# Patient Record
Sex: MALE | Race: WHITE | NOT HISPANIC OR LATINO | URBAN - METROPOLITAN AREA
[De-identification: names, ages, dates, MRNs, and addresses within clinical notes are randomized per-mention and may not be internally consistent; named-entity substitution may affect disease eponyms.]

---

## 2020-12-07 ENCOUNTER — EMERGENCY (EMERGENCY)
Facility: HOSPITAL | Age: 40
LOS: 1 days | End: 2020-12-07
Attending: EMERGENCY MEDICINE
Payer: COMMERCIAL

## 2020-12-07 VITALS
RESPIRATION RATE: 20 BRPM | OXYGEN SATURATION: 98 % | HEART RATE: 112 BPM | SYSTOLIC BLOOD PRESSURE: 135 MMHG | TEMPERATURE: 98 F | HEIGHT: 66 IN | DIASTOLIC BLOOD PRESSURE: 92 MMHG | WEIGHT: 139.99 LBS

## 2020-12-07 LAB
ALBUMIN SERPL ELPH-MCNC: 4.6 G/DL — SIGNIFICANT CHANGE UP (ref 3.3–5)
ALP SERPL-CCNC: 74 U/L — SIGNIFICANT CHANGE UP (ref 40–120)
ALT FLD-CCNC: 13 U/L — SIGNIFICANT CHANGE UP (ref 10–45)
ANION GAP SERPL CALC-SCNC: 11 MMOL/L — SIGNIFICANT CHANGE UP (ref 5–17)
AST SERPL-CCNC: 17 U/L — SIGNIFICANT CHANGE UP (ref 10–40)
BASOPHILS # BLD AUTO: 0.04 K/UL — SIGNIFICANT CHANGE UP (ref 0–0.2)
BASOPHILS NFR BLD AUTO: 0.5 % — SIGNIFICANT CHANGE UP (ref 0–2)
BILIRUB SERPL-MCNC: 1 MG/DL — SIGNIFICANT CHANGE UP (ref 0.2–1.2)
BUN SERPL-MCNC: 14 MG/DL — SIGNIFICANT CHANGE UP (ref 7–23)
CALCIUM SERPL-MCNC: 9.5 MG/DL — SIGNIFICANT CHANGE UP (ref 8.4–10.5)
CHLORIDE SERPL-SCNC: 103 MMOL/L — SIGNIFICANT CHANGE UP (ref 96–108)
CO2 SERPL-SCNC: 26 MMOL/L — SIGNIFICANT CHANGE UP (ref 22–31)
CREAT SERPL-MCNC: 1.02 MG/DL — SIGNIFICANT CHANGE UP (ref 0.5–1.3)
EOSINOPHIL # BLD AUTO: 0.02 K/UL — SIGNIFICANT CHANGE UP (ref 0–0.5)
EOSINOPHIL NFR BLD AUTO: 0.3 % — SIGNIFICANT CHANGE UP (ref 0–6)
GLUCOSE SERPL-MCNC: 94 MG/DL — SIGNIFICANT CHANGE UP (ref 70–99)
HCT VFR BLD CALC: 47.8 % — SIGNIFICANT CHANGE UP (ref 39–50)
HGB BLD-MCNC: 15.9 G/DL — SIGNIFICANT CHANGE UP (ref 13–17)
IMM GRANULOCYTES NFR BLD AUTO: 0.6 % — SIGNIFICANT CHANGE UP (ref 0–1.5)
LYMPHOCYTES # BLD AUTO: 1.14 K/UL — SIGNIFICANT CHANGE UP (ref 1–3.3)
LYMPHOCYTES # BLD AUTO: 14.4 % — SIGNIFICANT CHANGE UP (ref 13–44)
MAGNESIUM SERPL-MCNC: 2.2 MG/DL — SIGNIFICANT CHANGE UP (ref 1.6–2.6)
MCHC RBC-ENTMCNC: 30.2 PG — SIGNIFICANT CHANGE UP (ref 27–34)
MCHC RBC-ENTMCNC: 33.3 GM/DL — SIGNIFICANT CHANGE UP (ref 32–36)
MCV RBC AUTO: 90.9 FL — SIGNIFICANT CHANGE UP (ref 80–100)
MONOCYTES # BLD AUTO: 0.68 K/UL — SIGNIFICANT CHANGE UP (ref 0–0.9)
MONOCYTES NFR BLD AUTO: 8.6 % — SIGNIFICANT CHANGE UP (ref 2–14)
NEUTROPHILS # BLD AUTO: 5.97 K/UL — SIGNIFICANT CHANGE UP (ref 1.8–7.4)
NEUTROPHILS NFR BLD AUTO: 75.6 % — SIGNIFICANT CHANGE UP (ref 43–77)
NRBC # BLD: 0 /100 WBCS — SIGNIFICANT CHANGE UP (ref 0–0)
PHOSPHATE SERPL-MCNC: 2.7 MG/DL — SIGNIFICANT CHANGE UP (ref 2.5–4.5)
PLATELET # BLD AUTO: 206 K/UL — SIGNIFICANT CHANGE UP (ref 150–400)
POTASSIUM SERPL-MCNC: 4.5 MMOL/L — SIGNIFICANT CHANGE UP (ref 3.5–5.3)
POTASSIUM SERPL-SCNC: 4.5 MMOL/L — SIGNIFICANT CHANGE UP (ref 3.5–5.3)
PROT SERPL-MCNC: 6.9 G/DL — SIGNIFICANT CHANGE UP (ref 6–8.3)
RBC # BLD: 5.26 M/UL — SIGNIFICANT CHANGE UP (ref 4.2–5.8)
RBC # FLD: 11.9 % — SIGNIFICANT CHANGE UP (ref 10.3–14.5)
SARS-COV-2 RNA SPEC QL NAA+PROBE: SIGNIFICANT CHANGE UP
SODIUM SERPL-SCNC: 140 MMOL/L — SIGNIFICANT CHANGE UP (ref 135–145)
WBC # BLD: 7.9 K/UL — SIGNIFICANT CHANGE UP (ref 3.8–10.5)
WBC # FLD AUTO: 7.9 K/UL — SIGNIFICANT CHANGE UP (ref 3.8–10.5)

## 2020-12-07 PROCEDURE — 70498 CT ANGIOGRAPHY NECK: CPT | Mod: 26

## 2020-12-07 PROCEDURE — 70496 CT ANGIOGRAPHY HEAD: CPT | Mod: 26

## 2020-12-07 PROCEDURE — 99220: CPT

## 2020-12-07 RX ORDER — DIPHENHYDRAMINE HCL 50 MG
25 CAPSULE ORAL EVERY 6 HOURS
Refills: 0 | Status: DISCONTINUED | OUTPATIENT
Start: 2020-12-07 | End: 2020-12-11

## 2020-12-07 RX ORDER — ACETAMINOPHEN 500 MG
1000 TABLET ORAL ONCE
Refills: 0 | Status: DISCONTINUED | OUTPATIENT
Start: 2020-12-07 | End: 2020-12-11

## 2020-12-07 RX ORDER — METOCLOPRAMIDE HCL 10 MG
10 TABLET ORAL ONCE
Refills: 0 | Status: COMPLETED | OUTPATIENT
Start: 2020-12-07 | End: 2020-12-07

## 2020-12-07 RX ORDER — SODIUM CHLORIDE 9 MG/ML
1000 INJECTION INTRAMUSCULAR; INTRAVENOUS; SUBCUTANEOUS ONCE
Refills: 0 | Status: COMPLETED | OUTPATIENT
Start: 2020-12-07 | End: 2020-12-07

## 2020-12-07 RX ORDER — DIPHENHYDRAMINE HCL 50 MG
25 CAPSULE ORAL ONCE
Refills: 0 | Status: COMPLETED | OUTPATIENT
Start: 2020-12-07 | End: 2020-12-07

## 2020-12-07 RX ADMIN — Medication 10 MILLIGRAM(S): at 16:47

## 2020-12-07 RX ADMIN — SODIUM CHLORIDE 1000 MILLILITER(S): 9 INJECTION INTRAMUSCULAR; INTRAVENOUS; SUBCUTANEOUS at 16:47

## 2020-12-07 RX ADMIN — Medication 25 MILLIGRAM(S): at 16:47

## 2020-12-07 NOTE — ED CDU PROVIDER DISPOSITION NOTE - CLINICAL COURSE
40 year old male with a history of migraines, laminectomy for spinal cyst presents with left sided HA, residual left arm weakness, numbness and tingling after migraine yesterday. He reports that his migraines are usually associated with numbness and tingling in the arms, but symptoms usually resolve with Excedrin. The symptoms are lasting more than usual but more weakness of arm and leg which prompted him to come in today. He reports associated decreased left arm strength. He denies any facial drooping, slurred speech, fever, chills or cough.  In ED pts labs nonactionable. CT/CTA head without acute infarct, 7mm 4th ventricle possible choroid plexus tissue. pt was seen by neurology, went to CDU for MRI/neuro checks.  In CDU, 40 year old male with a history of migraines, laminectomy for spinal cyst presents with left sided HA, residual left arm weakness, numbness and tingling after migraine yesterday. He reports that his migraines are usually associated with numbness and tingling in the arms, but symptoms usually resolve with Excedrin. The symptoms are lasting more than usual but more weakness of arm and leg which prompted him to come in today. He reports associated decreased left arm strength. He denies any facial drooping, slurred speech, fever, chills or cough.  In ED pts labs nonactionable. CT/CTA head without acute infarct, 7mm 4th ventricle possible choroid plexus tissue. pt was seen by neurology, went to CDU for MRI/neuro checks.  In CDU, MRI results as follows. " MR brain: There is no mass, intracranial hemorrhage, or acute infarction. MR venogram head: No high-grade stenosis or occlusion within the dural venous sinuses". Pt feels imporved. Seen by Neuro this am who state -  "If MRI + for stroke will start ASA 81mg and lipitor 40mg daily.  - if MRI neg can f/u with me, Dr. Dru Duran as an outpatient for further workup." Given neg MRI will d/c home. Dr. Cruz has seen pt and is aware and agrees with plan.

## 2020-12-07 NOTE — ED PROVIDER NOTE - CLINICAL SUMMARY MEDICAL DECISION MAKING FREE TEXT BOX
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 No fever, chills, SOB, speech problems, or gait disturbance. Most likely hemiplegic migraine, but because of COVID, concern for neuro complication like stroke, TIA. No heart murmur but still needs workup for PFO is CT scan is abnormal. Will obtain blood work, CTA of head and neuro consult. ZR

## 2020-12-07 NOTE — ED CDU PROVIDER SUBSEQUENT DAY NOTE - MEDICAL DECISION MAKING DETAILS
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 No fever, chills, SOB, speech problems, or gait disturbance. Most likely hemiplegic migraine, but because of COVID, concern for neuro complication like stroke, TIA. No heart murmur to think of PFO ct scan in ER no evidence of acute stroke. Pt admitted  to CDU for neuro checks, neurology evaluation and MRI/MRV. Seen by neuro who agreed with plan. Will follow up results of MRI/MRV. If negative can follow up with Dr. Carey neurology. Pt looks good and is without complaints this morning  - ZR    Will obtain blood work, CTA of head and neuro consult.

## 2020-12-07 NOTE — CONSULT NOTE ADULT - ASSESSMENT
Rah Sevilla is a 41 yo L handed man with a history of migraines, laminectomy for spinal cyst, COVID in March who presents with HA and left sided weakness and numbness since yesterday. This HA differs from his previous migraines because it did not improve with Excedrin and he has persistent numbness and weakness. On neuro exam, he has decreased strength in LUE and LLE, and decreased sensation to the left side of his face. Labs are unremarkable.    Impression: Left sided weakness and subjective numbness in context of a change in characteristic of typical presentation of patient's migraine HA, history of COVID concerning for infarct localizable to the right side in possible posterior limb of internal capsule distribution, etiology questionable for ESUS. However, also in differential diagnosis is presentation of migraine HA.     [ ] MRI brain without contrast to r/o acute infarct  [ ] MRV to r/o a venous thrombus, though less likely   [ ] In acute setting, if HA returns, can treat with Ofirmev 1g, benadryl 25mg, and reglan 10mg.   [ ] If acute infarct found on MRI, will start aspirin 81mg.  [ ] After discharge, can follow up with Dr. Nissenbaum, [address] ([phone])     Rah Sevilla is a 39 yo L handed man with a history of migraines, laminectomy for spinal cyst, COVID in March who presents with HA and left sided weakness and numbness since yesterday. This HA differs from his previous migraines because it did not improve with Excedrin and he has persistent numbness and weakness. On neuro exam, he has decreased strength in LUE and LLE, and decreased sensation to the left side of his face. Labs are unremarkable.    Impression: Left sided weakness and subjective numbness in context of a change in characteristic of typical presentation of patient's migraine HA, history of COVID concerning for infarct localizable to the right side in possible posterior limb of internal capsule distribution, etiology questionable for ESUS. However, also in differential diagnosis is presentation of migraine HA with changes from patient's typical headache.    Recs:  [ ] MRI brain without contrast to r/o acute infarct  [ ] MRV to r/o a venous thrombus, though less likely   [ ] In acute setting, if HA returns, can treat with Ofirmev 1g, benadryl 25mg, and reglan 10mg.   [ ] If acute infarct found on MRI, will start aspirin 81mg.  [ ] After discharge, can follow up with Dr. Nissenbaum, [address] ([phone])    Case discussed and disposition finalized with stroke fellow Dr. Montes. Further recommendations to follow upon review by stroke attending, Dr. Duran.

## 2020-12-07 NOTE — ED ADULT TRIAGE NOTE - CHIEF COMPLAINT QUOTE
Pt stated he woke up with a "migraine headache" this morning at 630am with left sided numbness. Pt went to sleep last night at 11pm  hx: migraine

## 2020-12-07 NOTE — ED CDU PROVIDER DISPOSITION NOTE - CARE PROVIDER_API CALL
Dru Duran)  Neurology; Vascular Neurology  [address] [facility] [address]  [place] [address]  Phone: [phone]  Fax: [phone]  Follow Up Time:

## 2020-12-07 NOTE — ED ADULT NURSE NOTE - NS_ED_NURSE_TEACHING_TOPIC_ED_A_ED
Patient says she received a bill not sure why she received it  I advised that she bring the bill by the office so we could look at it, but she lives far away and has no fax  I told her I would take down her info and we will return her call to look into bill  Neurovascular/Migraine Headache

## 2020-12-07 NOTE — ED CDU PROVIDER DISPOSITION NOTE - NSFOLLOWUPINSTRUCTIONS_ED_ALL_ED_FT
Follow up with your primary care doctor within 1 week    Follow up with Neurology in 1-5 days - see attached contact info.  *Bring all attached lab/test results.*    Stay well hydrated.  Continue current home medications    Read stroke education material given to you.    Return if symptoms worsen, fever, weakness, numbness, dizziness, vision change, slurred speech and all other concerns Dru Duran)  Neurology; Vascular Neurology  [address] [facility] [address]  [place] [address]  Phone: [phone]  Fax: [phone]      1. Follow up with your primary care doctor within 1 week    2. Follow up with your Neurologist Dr. Carey or Stroke Neurology Dr. Martinez  in 1-5 days - see above contact info. Bring all attached lab/test results.     3. Stay well hydrated. Continue current home medications    4. Read stroke education material given to you.    5. Return if symptoms worsen, fever, weakness, numbness, dizziness, vision change, slurred speech and all other concerns    --------------------  **Stroke Prevention**  Some medical conditions and behaviors are associated with a higher chance of having a stroke. You can help prevent a stroke by making nutrition, lifestyle, and other changes, including managing any medical conditions you may have.  WHAT NUTRITION CHANGES CAN BE MADE?  Eat healthy foods. You can do this by:  · Choosing foods high in fiber, such as fresh fruits and vegetables and whole grains.  · Eating at least 5 or more servings of fruits and vegetables a day. Try to fill half of your plate at each meal with fruits and vegetables.  · Choosing lean protein foods, such as lean cuts of meat, poultry without skin, fish, tofu, beans, and nuts.  · Eating low-fat dairy products.  · Avoiding foods that are high in salt (sodium). This can help lower blood pressure.  · Avoiding foods that have saturated fat, trans fat, and cholesterol. This can help prevent high cholesterol.  · Avoiding processed and premade foods.  Follow your health care provider's specific guidelines for losing weight, controlling high blood pressure (hypertension), lowering high cholesterol, and managing diabetes. These may include:  · Reducing your daily calorie intake.  · Limiting your daily sodium intake to 1,500 milligrams (mg).  · Using only healthy fats for cooking, such as olive oil, canola oil, or sunflower oil.  · Counting your daily carbohydrate intake.  WHAT LIFESTYLE CHANGES CAN BE MADE?  Maintain a healthy weight. Talk to your health care provider about your ideal weight.  Get at least 30 minutes of moderate physical activity at least 5 days a week. Moderate activity includes brisk walking, biking, and swimming.  Do not use any products that contain nicotine or tobacco, such as cigarettes and e-cigarettes. If you need  help quitting, ask your health care provider. It may also be helpful to avoid exposure to secondhand smoke.  Limit alcohol intake to no more than 1 drink a day for nonpregnant women and 2 drinks a day for men. One  drink equals 12 oz of beer, 5 oz of wine, or 1½ oz of hard liquor.  Stop any illegal drug use.      Avoid taking birth control pills. Talk to your health care provider about the risks of taking birth control pills if:  · You are over 35 years old.  · You smoke.  · You get migraines.  · You have ever had a blood clot.  WHAT OTHER CHANGES CAN BE MADE?  Manage your cholesterol levels.  · Eating a healthy diet is important for preventing high cholesterol. If cholesterol cannot be managed  through diet alone, you may also need to take medicines.  · Take any prescribed medicines to control your cholesterol as told by your health care provider.  Manage your diabetes.  · Eating a healthy diet and exercising regularly are important parts of managing your blood sugar. If your  blood sugar cannot be managed through diet and exercise, you may need to take medicines.  · Take any prescribed medicines to control your diabetes as told by your health care provider.  Control your hypertension.  · To reduce your risk of stroke, try to keep your blood pressure below 130/80.  · Eating a healthy diet and exercising regularly are an important part of controlling your blood pressure. If  your blood pressure cannot be managed through diet and exercise, you may need to take medicines.  · Take any prescribed medicines to control hypertension as told by your health care provider.  · Ask your health care provider if you should monitor your blood pressure at home.  · Have your blood pressure checked every year, even if your blood pressure is normal. Blood pressure  increases with age and some medical conditions.  Get evaluated for sleep disorders (sleep apnea). Talk to your health care provider about getting a sleep evaluation if you snore a lot or have excessive sleepiness.  Take over-the-counter and prescription medicines only as told by your health care provider. Aspirin or blood thinners (antiplatelets or anticoagulants) may be recommended to reduce your risk of forming blood clots that can lead to stroke.  Make sure that any other medical conditions you have, such as atrial fibrillation or atherosclerosis, are  managed.  WHAT ARE THE WARNING SIGNS OF A STROKE?  The warning signs of a stroke can be easily remembered as BEFAST.  B is for balance. Signs include:  · Dizziness.  · Loss of balance or coordination.  · Sudden trouble walking.  E is for eyes. Signs include:  · A sudden change in vision.  · Trouble seeing.  F is for face. Signs include:  · Sudden weakness or numbness of the face.  · The face or eyelid drooping to one side.  A is for arms. Signs include:  · Sudden weakness or numbness of the arm, usually on one side of the body.  S is for speech. Signs include:  · Trouble speaking (aphasia).  · Trouble understanding.  T is for time.      · These symptoms may represent a serious problem that is an emergency. Do not wait to see if the symptoms will go away. Get medical help right away. Call your local emergency services (911 in the [place] Do not drive yourself to the hospital.  Other signs of stroke may include:  · A sudden, severe headache with no known cause.  · Nausea or vomiting.  · Seizure.    WHERE TO FIND MORE INFORMATION  For more information, visit:  American Stroke Association: www.strokeassociation.org  National Stroke Association: www.stroke.org  SUMMARY  You can prevent a stroke by eating healthy, exercising, not smoking, limiting alcohol intake, and managing  any medical conditions you may have.  Do not use any products that contain nicotine or tobacco, such as cigarettes and e-cigarettes. If you need  help quitting, ask your health care provider. It may also be helpful to avoid exposure to secondhand smoke.  Remember BEFAST for warning signs of stroke. Get help right away if you or a loved one has any of these  signs.  ADDITIONAL NOTES AND INSTRUCTIONS  Please follow up with your Primary MD in 24-48 hr.  Seek immediate medical care for any new/worsening signs or symptoms.

## 2020-12-07 NOTE — ED CDU PROVIDER DISPOSITION NOTE - PATIENT PORTAL LINK FT
You can access the FollowMyHealth Patient Portal offered by [facility] by registering at the following website: [email or web address]. By joining [facility]’s FollowMyHealth portal, you will also be able to view your health information using other applications (apps) compatible with our system.

## 2020-12-07 NOTE — ED CLERICAL - NS ED CLERK NOTE PRE-ARRIVAL INFORMATION; ADDITIONAL PRE-ARRIVAL INFORMATION
CC/Reason For referral: left sided weakness may be Migraines  r/o CVA  Preferred Consultant(if applicable): Cherry  Who admits for you (if needed): Cherry  Do you have documents you would like to fax over? no   Would you still like to speak to an ED attending? no

## 2020-12-07 NOTE — ED CDU PROVIDER INITIAL DAY NOTE - OBJECTIVE STATEMENT
40 year old male with a history of migraines, laminectomy for spinal cyst presents with left sided HA, residual left arm weakness, numbness and tingling after migraine yesterday. He reports that his migraines are usually associated with numbness and tingling in the arms, but symptoms usually resolve with Excedrin. The symptoms are lasting more than usual but more weakness of arm and leg which prompted him to come in today. He reports associated decreased left arm strength. He denies any facial drooping, slurred speech, fever, chills or cough. 40 year old male with a history of migraines, laminectomy for spinal cyst presents with left sided HA, residual left arm weakness, numbness and tingling after migraine yesterday. He reports that his migraines are usually associated with numbness and tingling in the arms, but symptoms usually resolve with Excedrin. The symptoms are lasting more than usual but more weakness of arm and leg which prompted him to come in today. He reports associated decreased left arm strength. He denies any facial drooping, slurred speech, fever, chills or cough.  In ED pts labs nonactionable. CT/CTA head without acute infarct, 7mm 4th ventricle possible choroid plexus tissue. pt was seen by neurology, went to CDU for MRI/neuro checks.

## 2020-12-07 NOTE — ED ADULT NURSE NOTE - OBJECTIVE STATEMENT
Patient is a 40 year old male with pmhx migraine complaining of L. sided facial numbness and LUE weakness since 0630 today, went to bed at 2300 yesterday and woke up with numbness/ weakness/ migraine. Arrived by walk-in. Patient is A&O x 4. Pt has 4/5 strength in LUE and numbness to L cheek, no speech issues or facial droop. All other extremities 5/5. Pt reports he took Excedrin without relief. Headache resolved at this time. Denies complaints of chest pain, sob, fevers, chills, n/v/d, syncope, burning urination, blood in urine, blood in stool, sick contacts, back pain, swelling in the legs, rash. NAD). Patient is a 40 year old male with pmhx migraine complaining of L. sided facial numbness and LUE weakness since 0630 today, went to bed at 2300 yesterday and woke up with numbness/ weakness/ migraine. Arrived by walk-in. Patient is A&O x 4. Pt has 4/5 strength in LUE and numbness to L cheek, no speech issues or facial droop. All other extremities 5/5. Pt reports he took Excedrin without relief. Headache resolved at this time. Denies complaints of chest pain, sob, fevers, chills, n/v/d, syncope, burning urination, blood in urine, blood in stool, sick contacts, back pain, swelling in the legs, rash. NAD.    See Neuro/VS Flow Sheet in paper chart.

## 2020-12-07 NOTE — ED CDU PROVIDER INITIAL DAY NOTE - MEDICAL DECISION MAKING DETAILS
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 No fever, chills, SOB, speech problems, or gait disturbance. Most likely hemiplegic migraine, but because of COVID, concern for neuro complication like stroke, TIA. No heart murmur to think of PFO ct scan in ER no evidence of acute stroke. will admit to CDU for neuro checks, MRI tomorrow am and neurology evaluation again, - ZR    Will obtain blood work, CTA of head and neuro consult. JANICE

## 2020-12-07 NOTE — ED ADULT NURSE REASSESSMENT NOTE - NS ED NURSE REASSESS COMMENT FT1
Received report @ 2391, pt lying in bed comfortably and denies any pain or discomfort. Pt currently eating food, tolerating well. Pt awaiting to be moved over to CDU. VS stable. Received report @ 2215, pt lying in bed comfortably and denies any pain or discomfort. On exam PT A&Ox3, neg facial drop, neg slurred speech, PERRL 3mm, denies blurred vision, equal  strength and sensations in all extremities. Denies numbness, tingling, or weakness. Pt currently eating food, tolerating well. Pt awaiting to be moved over to CDU. VS stable.

## 2020-12-07 NOTE — ED ADULT NURSE REASSESSMENT NOTE - NS ED NURSE REASSESS COMMENT FT1
Pt received from NOEMÍ Mercado. Pt oriented to CDU & plan of care was discussed. Pt AO4. Neuro check intact. No deficits noted. Pt states numbness/ tingling & weakness to L side has resolved. Pt also denies any headache. Safety & comfort measures maintained. Call bell in reach. Will continue to monitor.

## 2020-12-07 NOTE — ED PROVIDER NOTE - OBJECTIVE STATEMENT
40 year old male with a history of migraines, laminectomy for spinal cyst presents with left sided HA, residual left arm weakness, numbness and tingling after migraine yesterday. He reports that his migraines are usually associated with numbness and tingling in the arms, but symptoms usually resolve with Excedrin. The symptoms are lasting more than usual but more weakness of arm and leg which prompted him to come in today. He reports associated decreased left arm strength. He denies any facial drooping, slurred speech, fever, chills or cough.

## 2020-12-07 NOTE — CONSULT NOTE ADULT - ATTENDING COMMENTS
Briefly 39 yo [place] M with h/o complex migraines, laminectomy for spinal cyst, Covid in March 2020 p/w HA and L sided numbness weakness.  HA resolved. still with some LUE  weakness and slow FFM.  CTH neg, except assymetric choroid plexus in ventricle can't r/o subependymoma.  CTA H/N neg, , A1c 5.2%. r/o stroke vs itracranial mass, if imaging negative likely complex migraine but unusual for symptoms to last as long as they have.  - MRI brain w/ and w/o; MRV pending  - HA resolved  -  If MRI + for stroke will start ASA 81mg and lipitor 40mg daily.  - if MRI neg can f/u with me, Dr. Dru Duran as an outpatient for further workup.   - spoke with CDU team  Dru Duran MD  Vascular Neurology  Office: [phone]

## 2020-12-07 NOTE — ED CDU PROVIDER SUBSEQUENT DAY NOTE - HISTORY
CDU PROGRESS NOTE LI LINDSAY: No interval change. pt resting comfortably without complaint. Denies HA, numbness, weakness at this time. neuro exam grossly intact. NAD. VSS. no events on tele. Will continue to monitor.

## 2020-12-07 NOTE — ED CDU PROVIDER SUBSEQUENT DAY NOTE - ATTENDING CONTRIBUTION TO CARE
VITAL SIGNS: I have reviewed nursing notes and confirm.  CONSTITUTIONAL: Well-developed; well-nourished; in no acute distress.  SKIN: Skin exam is warm and dry, no acute rash.  HEAD: Normocephalic; atraumatic.  EYES: Conjunctiva and sclera clear.  ENT: No nasal discharge; airway clear.   NECK: Supple; non tender.  CARD: S1, S2 normal; no murmurs, gallops, or rubs. Regular rate and rhythm.  RESP: No wheezes, rales or rhonchi. Speaking in full sentences.   ABD: Normal bowel sounds; soft; non-distended; non-tender; No rebound or guarding.   EXT: Normal ROM. No clubbing, cyanosis or edema.  NEURO: Alert, oriented. Grossly unremarkable. No focal deficits.
look at mdm

## 2020-12-07 NOTE — ED CDU PROVIDER SUBSEQUENT DAY NOTE - PROGRESS NOTE DETAILS
CDU PROGRESS NOTE LI LINDSAY: Pt resting comfortably without complaint. NAD. VSS. neuro exam grossly intact, no HA currently. no events on tele. Will continue to monitor. CDU NOTE LI Gunn: VSS NAD. Patient is resting comfortably and is without any complaints. Still w/ slight left sided weakness. Seen by Neuro attending Dr. Duran this am, pending MRI CDU NOTE LI Gunn: VSS NAD. Patient is resting comfortably and is without any complaints.  Seen by Neuro attending Dr. Duran this am, pending MRI CDU NOTE LI Gunn: VSS NAD. Patient is resting comfortably and is without any complaints. MRI results as follows. " MR brain: There is no mass, intracranial hemorrhage, or acute infarction. MR venogram head: No high-grade stenosis or occlusion within the dural venous sinuses". Pt feels imporved. Seen by Neuro this am who state -  "If MRI + for stroke will start ASA 81mg and lipitor 40mg daily.  - if MRI neg can f/u with me, Dr. Dru Duran as an outpatient for further workup." Given neg MRI will d/c home. Dr. Cruz has seen pt and is aware and agrees with plan

## 2020-12-07 NOTE — CONSULT NOTE ADULT - SUBJECTIVE AND OBJECTIVE BOX
*************************************  NEUROLOGY CONSULT  SERVICE  **************************************    RAH SEVILLA  MRN-[id number]    HPI:  Rah Sevilla is a 41 yo L handed man with a history of migraines, laminectomy for spinal cyst, COVID in March who presents with HA and left sided weakness and numbness since yesterday. He states that he had a mild HA last night, but took no medication and went to bed. This morning, he woke up naturally with a more intense HA and left sided weakness and numbness. He took Excedrin, which normally relieves his migraines, but did not have any improvement of his symptoms. Now in the ED, he says that the HA and the weakness and numbness in his LLE have improved. However, he still reports numbness in his face and LUE, and LUE weakness. No right sided symptoms, visual changes, hearing changes, or dizziness. ED provider stated he had a murmur on cardiac exam.     He has had migraines since age 14. The frequency varies, but he says he has been having more migraines recently with 3 in the last month. He currently does not take preventative medication, though mentioned he took aspirin when he was younger. He uses Excedrin to abort his migraines. He has had previous migraines with numbness and weakness, but these would improve with Excedrin.        ROS: All negative except as mentioned in HPI    PAST MEDICAL & SURGICAL HISTORY:  History of migraines  S/p laminectomy     MEDICATIONS  (STANDING):    MEDICATIONS  (PRN):    Allergies    tetanus immune globulin (Other)          VITAL SIGNS:  Vital Signs Last 24 Hrs  T(C): 36.7 (07 Dec 2020 15:11), Max: 36.7 (07 Dec 2020 15:11)  T(F): 98 (07 Dec 2020 15:11), Max: 98 (07 Dec 2020 15:11)  HR: 101 (07 Dec 2020 16:22) (101 - 112)  BP: 131/101 (07 Dec 2020 16:22) (131/101 - 135/92)  BP(mean): --  RR: 19 (07 Dec 2020 16:22) (19 - 20)  SpO2: 100% (07 Dec 2020 16:22) (98% - 100%)    PHYSICAL EXAMINATION:  General: Well-developed, well nourished, in no acute distress.  Eyes: Conjunctiva and sclera clear.  Lung: no respiratory distress noted, breathing comfortably on room air  Cardiac: RRR, normal S1, S2, no murmurs, rubs, or gallops, no edema  Neurologic:  - Mental Status:  Alert, awake, oriented to person, place, and time; Speech is fluent with intact naming, repetition, and comprehension; crosses midline, no extinction or neglect noted;   - Cranial Nerves II-XII:  VFF, No nystagmus noted, EOMI, PERRLA, decreased sensation on left side of face in no definitive CN distribution, no facial asymmetry, t/p midline, SCM/trap intact.  - Motor:  Strength is 5/5 in RUE and RLE, 4+/5 in LUE proximally and distally with exception of finger flexion which is 5/5, LLE hip flexion 4+/5, knee flexion and extension 4+/5, plantarflexion and dorsiflexion 5/5. There is no pronator drift.  Normal muscle bulk and tone throughout. No myoclonus or tremor.  - Reflexes: 2+ and symmetric throughout. Plantar responses flexor.  - Sensory:  Intact to light touch throughout with exception of decreased sensation on left side of face  - Coordination:  Finger-nose-finger without dysmetria.    - Gait:   Normal steps, base, arm swing, and turning.      LABS:                          15.9   7.90  )-----------( 206      ( 07 Dec 2020 16:22 )             47.8     12-07    140  |  103  |  14  ----------------------------<  94  4.5   |  26  |  1.02    Ca    9.5      07 Dec 2020 16:22  Phos  2.7     12-07  Mg     2.2     12-07    TPro  6.9  /  Alb  4.6  /  TBili  1.0  /  DBili  x   /  AST  17  /  ALT  13  /  AlkPhos  74  12-07        RADIOLOGY & ADDITIONAL STUDIES:             *************************************  NEUROLOGY CONSULT  SERVICE  **************************************    RAH SEVILLA  MRN-[id number]    HPI:  Rah Sevilla is a 39 yo L handed man with a history of migraines, laminectomy for spinal cyst, COVID in March who presents with HA and left sided weakness and numbness since yesterday. He has had migraines since age 14. The frequency varies with sometimes migraines appearing only several times a year or not at all, but he says he has been having more migraines recently with 3 in the last month. He currently does not take preventative medication, though mentioned he took aspirin when he was younger and also previously used sumatriptan. He uses Excedrin to abort his migraines which he states always works. He has had previous migraines with numbness and weakness, but these would improve with Excedrin. He states that he had a mild HA last night, but took no medication and went to bed. This morning, he woke up naturally with a more intense HA and left sided weakness and numbness. He took Excedrin, which normally relieves his migraines, but did not have any improvement of his symptoms. Now in the ED, he says that the HA and the weakness and numbness in his LLE have improved. However, he still reports numbness in his face and LUE, and LUE weakness. No right sided symptoms, visual changes, hearing changes, dizziness, difficulty speaking or swallowing. Did state that he though his gait was slightly abnormal but now has returned to baseline. ED provider stated he had a murmur on cardiac exam.     ROS: All negative except as mentioned in HPI    PAST MEDICAL & SURGICAL HISTORY:  History of migraines  S/p laminectomy     Allergies    tetanus immune globulin (Other)          VITAL SIGNS:  Vital Signs Last 24 Hrs  T(C): 36.7 (07 Dec 2020 15:11), Max: 36.7 (07 Dec 2020 15:11)  T(F): 98 (07 Dec 2020 15:11), Max: 98 (07 Dec 2020 15:11)  HR: 101 (07 Dec 2020 16:22) (101 - 112)  BP: 131/101 (07 Dec 2020 16:22) (131/101 - 135/92)  BP(mean): --  RR: 19 (07 Dec 2020 16:22) (19 - 20)  SpO2: 100% (07 Dec 2020 16:22) (98% - 100%)    PHYSICAL EXAMINATION:  General: Well-developed, well nourished, in no acute distress.  Eyes: Conjunctiva and sclera clear.  Cardiac: RRR, normal S1, S2, no murmurs, rubs, or gallops, no edema noted  Neurologic:  - Mental Status:  Alert, awake, oriented to person, place, and time; Speech is fluent with intact naming, repetition, and comprehension; crosses midline, no extinction or neglect noted;   - Cranial Nerves II-XII:  VF intact, No nystagmus noted, EOMI, PERRLA 4mm bilaterally, decreased sensation on left side of face in no definitive CN distribution, no facial asymmetry, t/p midline, SCM/trap intact.  - Motor:  Strength is 5/5 in RUE and RLE, 4+/5 in LUE proximally and distally with exception of finger flexion which is 5/5, LLE hip flexion 4+/5, knee flexion and extension 4+/5, plantarflexion and dorsiflexion 5/5. There is no pronator drift.  Normal muscle bulk and tone throughout. No myoclonus or tremor.  - Reflexes: 2+ and symmetric throughout. Plantar responses flexor.  - Sensory:  Intact to light touch throughout with exception of decreased sensation on left side of face and L hand in no particular nerve distrubution  - Coordination:  Finger-nose-finger without dysmetria.    - Gait:   Normal steps, base, arm swing, and turning.      LABS:                          15.9   7.90  )-----------( 206      ( 07 Dec 2020 16:22 )             47.8     12-07    140  |  103  |  14  ----------------------------<  94  4.5   |  26  |  1.02    Ca    9.5      07 Dec 2020 16:22  Phos  2.7     12-07  Mg     2.2     12-07    TPro  6.9  /  Alb  4.6  /  TBili  1.0  /  DBili  x   /  AST  17  /  ALT  13  /  AlkPhos  74  12-07        RADIOLOGY & ADDITIONAL STUDIES:      CT Head to my eye with no apparent acute findings. Will pend final read

## 2020-12-07 NOTE — ED CDU PROVIDER INITIAL DAY NOTE - DETAILS
40y m pmhx migraines p/w HA with left sided weakness/numbness:  -q4 neuro checks, MRI head w/o, MRV head w/o  -neurology following    case d/w Dr. Cruz

## 2020-12-08 VITALS
TEMPERATURE: 98 F | SYSTOLIC BLOOD PRESSURE: 117 MMHG | HEART RATE: 84 BPM | DIASTOLIC BLOOD PRESSURE: 72 MMHG | RESPIRATION RATE: 18 BRPM | OXYGEN SATURATION: 97 %

## 2020-12-08 LAB
A1C WITH ESTIMATED AVERAGE GLUCOSE RESULT: 5.2 % — SIGNIFICANT CHANGE UP (ref 4–5.6)
CHOLEST SERPL-MCNC: 171 MG/DL — SIGNIFICANT CHANGE UP
ESTIMATED AVERAGE GLUCOSE: 103 MG/DL — SIGNIFICANT CHANGE UP (ref 68–114)
HDLC SERPL-MCNC: 44 MG/DL — SIGNIFICANT CHANGE UP
LIPID PNL WITH DIRECT LDL SERPL: 100 MG/DL — HIGH
NON HDL CHOLESTEROL: 126 MG/DL — SIGNIFICANT CHANGE UP
TRIGL SERPL-MCNC: 132 MG/DL — SIGNIFICANT CHANGE UP

## 2020-12-08 PROCEDURE — 70498 CT ANGIOGRAPHY NECK: CPT

## 2020-12-08 PROCEDURE — 85025 COMPLETE CBC W/AUTO DIFF WBC: CPT

## 2020-12-08 PROCEDURE — 83036 HEMOGLOBIN GLYCOSYLATED A1C: CPT

## 2020-12-08 PROCEDURE — A9585: CPT

## 2020-12-08 PROCEDURE — 70496 CT ANGIOGRAPHY HEAD: CPT

## 2020-12-08 PROCEDURE — 80061 LIPID PANEL: CPT

## 2020-12-08 PROCEDURE — 80053 COMPREHEN METABOLIC PANEL: CPT

## 2020-12-08 PROCEDURE — 99217: CPT

## 2020-12-08 PROCEDURE — 85652 RBC SED RATE AUTOMATED: CPT

## 2020-12-08 PROCEDURE — 70545 MR ANGIOGRAPHY HEAD W/DYE: CPT

## 2020-12-08 PROCEDURE — 83735 ASSAY OF MAGNESIUM: CPT

## 2020-12-08 PROCEDURE — G0378: CPT

## 2020-12-08 PROCEDURE — 82962 GLUCOSE BLOOD TEST: CPT

## 2020-12-08 PROCEDURE — 96374 THER/PROPH/DIAG INJ IV PUSH: CPT | Mod: XU

## 2020-12-08 PROCEDURE — 70553 MRI BRAIN STEM W/O & W/DYE: CPT | Mod: 26

## 2020-12-08 PROCEDURE — 87635 SARS-COV-2 COVID-19 AMP PRB: CPT

## 2020-12-08 PROCEDURE — 70553 MRI BRAIN STEM W/O & W/DYE: CPT

## 2020-12-08 PROCEDURE — 99284 EMERGENCY DEPT VISIT MOD MDM: CPT | Mod: 25

## 2020-12-08 PROCEDURE — 70545 MR ANGIOGRAPHY HEAD W/DYE: CPT | Mod: 26,59

## 2020-12-08 PROCEDURE — 84100 ASSAY OF PHOSPHORUS: CPT

## 2020-12-08 NOTE — ED ADULT NURSE REASSESSMENT NOTE - NS ED NURSE REASSESS COMMENT FT1
07.00 Am Received the Pt from  NOEMÍ Castellano . Pt is Observed for Migraine headache for MRI. Received the Pt A&OX 4 obeys commands Erica N/V/D fever chills cp SOB   Comfort care & safety measures continued  IV site looks clean & dry no signs of infiltration noted pt denies  pain IV site .  Pt is advised to call for help  call bell with in the reach pt verbalized the understanding .   pending CDU  MD mitchell . GCS 15/15 A&OX 4 PERRLA  size 3 Strong upper & lower extremities steady gait   No facial droop  No Hand Leg drop denies numbness tingling Continue to monitor  13.00  Pt is evaluated by CDU MD Nancy Sparks . pt is feeling better.  Pt is discharged . Ml ravin Cannon   explained the follow up care & gave the discharge summary  . Pt has stable vitals steady gait A&OX 4 at the time of Discharge

## 2020-12-08 NOTE — ED ADULT NURSE REASSESSMENT NOTE - NS ED NURSE REASSESS COMMENT FT1
Pt AO4. Neuro check intact. No deficits noted. Pt denies headache, numbness/ weakness. Safety maintained. Will continue to monitor.
